# Patient Record
Sex: FEMALE | NOT HISPANIC OR LATINO | Employment: FULL TIME | URBAN - NONMETROPOLITAN AREA
[De-identification: names, ages, dates, MRNs, and addresses within clinical notes are randomized per-mention and may not be internally consistent; named-entity substitution may affect disease eponyms.]

---

## 2024-01-28 ENCOUNTER — HOSPITAL ENCOUNTER (EMERGENCY)
Facility: HOSPITAL | Age: 29
Discharge: HOME | End: 2024-01-28
Attending: EMERGENCY MEDICINE
Payer: COMMERCIAL

## 2024-01-28 ENCOUNTER — APPOINTMENT (OUTPATIENT)
Dept: RADIOLOGY | Facility: HOSPITAL | Age: 29
End: 2024-01-28
Payer: COMMERCIAL

## 2024-01-28 VITALS
HEIGHT: 63 IN | OXYGEN SATURATION: 99 % | TEMPERATURE: 97.8 F | WEIGHT: 167 LBS | RESPIRATION RATE: 18 BRPM | SYSTOLIC BLOOD PRESSURE: 127 MMHG | HEART RATE: 92 BPM | BODY MASS INDEX: 29.59 KG/M2 | DIASTOLIC BLOOD PRESSURE: 93 MMHG

## 2024-01-28 DIAGNOSIS — S83.005A PATELLAR DISLOCATION, LEFT, INITIAL ENCOUNTER: Primary | ICD-10-CM

## 2024-01-28 PROCEDURE — 99283 EMERGENCY DEPT VISIT LOW MDM: CPT | Mod: 25

## 2024-01-28 PROCEDURE — 99283 EMERGENCY DEPT VISIT LOW MDM: CPT | Performed by: EMERGENCY MEDICINE

## 2024-01-28 PROCEDURE — 73564 X-RAY EXAM KNEE 4 OR MORE: CPT | Mod: LT

## 2024-01-28 PROCEDURE — 73564 X-RAY EXAM KNEE 4 OR MORE: CPT | Mod: LEFT SIDE | Performed by: RADIOLOGY

## 2024-01-28 RX ORDER — ACETAMINOPHEN 325 MG/1
650 TABLET ORAL ONCE
Status: COMPLETED | OUTPATIENT
Start: 2024-01-28 | End: 2024-01-28

## 2024-01-28 RX ADMIN — ACETAMINOPHEN 650 MG: 325 TABLET ORAL at 21:33

## 2024-01-28 ASSESSMENT — PAIN SCALES - GENERAL
PAINLEVEL_OUTOF10: 8
PAINLEVEL_OUTOF10: 3
PAINLEVEL_OUTOF10: 8

## 2024-01-28 ASSESSMENT — PAIN - FUNCTIONAL ASSESSMENT
PAIN_FUNCTIONAL_ASSESSMENT: 0-10

## 2024-01-28 ASSESSMENT — PAIN DESCRIPTION - ORIENTATION
ORIENTATION: LEFT
ORIENTATION: LEFT

## 2024-01-28 ASSESSMENT — COLUMBIA-SUICIDE SEVERITY RATING SCALE - C-SSRS
1. IN THE PAST MONTH, HAVE YOU WISHED YOU WERE DEAD OR WISHED YOU COULD GO TO SLEEP AND NOT WAKE UP?: NO
6. HAVE YOU EVER DONE ANYTHING, STARTED TO DO ANYTHING, OR PREPARED TO DO ANYTHING TO END YOUR LIFE?: NO
2. HAVE YOU ACTUALLY HAD ANY THOUGHTS OF KILLING YOURSELF?: NO

## 2024-01-28 ASSESSMENT — PAIN DESCRIPTION - ONSET: ONSET: SUDDEN

## 2024-01-28 ASSESSMENT — PAIN DESCRIPTION - FREQUENCY: FREQUENCY: CONSTANT/CONTINUOUS

## 2024-01-28 ASSESSMENT — PAIN DESCRIPTION - LOCATION
LOCATION: KNEE
LOCATION: KNEE

## 2024-01-28 NOTE — Clinical Note
Ambreen Delgado was seen and treated in our emergency department on 1/28/2024.  She may return to work on 01/31/2024.  Orthopedic clearance required to return to work     If you have any questions or concerns, please don't hesitate to call.      Layla West MD

## 2024-01-29 NOTE — ED PROVIDER NOTES
"HPI   Chief Complaint   Patient presents with    Knee Injury         History provided by:  Patient   used: No         This patient presents to the emergency department ambulatory via private vehicle for evaluation of left knee injury.  Patient states she was getting out of the shower and tried to get dressed at home just prior to arrival when she lost her balance and fell.  She is not certain if she landed directly on her knee, but she relates that her kneecap was \"pointing off to the side\" and was severely painful.  Patient states that when she straightened her leg she felt it pop back into place, but is still having pain; therefore, decided come to emergency department.  She gave herself some ibuprofen prior to arrival.  She states she has knees that tend to \"give out on me\", but has never had an orthopedic evaluation or any specific knee injury that she is aware of.  No open wounds.  No numbness or tingling.  She denies any other injury with the fall.               Matt Coma Scale Score: 15                  Patient History   History reviewed. No pertinent past medical history.  History reviewed. No pertinent surgical history.  No family history on file.  Social History     Tobacco Use    Smoking status: Every Day     Packs/day: 1     Types: Cigarettes    Smokeless tobacco: Never   Substance Use Topics    Alcohol use: Not on file    Drug use: Not on file       Physical Exam   ED Triage Vitals [01/28/24 2111]   Temperature Heart Rate Respirations BP   36.6 °C (97.8 °F) (!) 122 18 145/89      SpO2 Temp Source Heart Rate Source Patient Position   98 % Skin -- Sitting      BP Location FiO2 (%)     Left arm --       Physical Exam  Vitals reviewed.   Constitutional:       Appearance: Normal appearance.   HENT:      Head: Normocephalic and atraumatic.   Cardiovascular:      Pulses: Normal pulses.   Pulmonary:      Effort: Pulmonary effort is normal.   Musculoskeletal:         General: Tenderness " present.      Comments: Patient left anterior musculoskeletal: examination of the symptomatic knee shows No visiible redness, warmth or swelling. No jointline tenderness. No ligamentous laxity. No effusion. Negative patellar apprehension. Intact popliteal pulse. Intact DP and PT pulses.   No joint line tenderness.  No effusion.  Positive patellar apprehension.  Intact patellar tendon to palpation.  Able to fully extend her knee against gravity.  Causes pain with flexion.   Skin:     General: Skin is warm and dry.      Capillary Refill: Capillary refill takes less than 2 seconds.      Findings: No bruising, erythema or rash.   Neurological:      General: No focal deficit present.      Mental Status: She is alert.   Psychiatric:         Mood and Affect: Mood normal.       ED Medication Administration from 01/28/2024 2105 to 01/28/2024 2213         Date/Time Order Dose Route Action Action by     01/28/2024 2133 EST acetaminophen (Tylenol) tablet 650 mg 650 mg oral Given PEDRITO Cr          XR knee left 4+ views   Final Result   Normal radiographs of the left knee             MACRO:   None        Signed by: Gian Banks 1/28/2024 9:59 PM   Dictation workstation:   [id number]            ED Course & MDM   ED Course as of 01/28/24 2220   Sun Jan 28, 2024 2206 Results reviewed with patient [MN]      ED Course User Index  [MN] Layla West MD         Diagnoses as of 01/28/24 2220   Patellar dislocation, left, initial encounter       Medical Decision Making  Presents emergency department with the above history and physical.  Patient has no evidence of neurovascular injury or deformity.  No open wounds.  Given mechanism of injury and areas of pain and tenderness x-rays obtained to evaluate for joint derangement or bony injury.  Patient was given oral Tylenol for pain and ice pack was provided by nursing staff.  X-rays were read by radiology.  Evidence of dislocation/subluxation, effusion, fracture.  Patient placed  in knee immobilizer by nursing staff and provided crutches for additional support and comfort.  Orthopedic evaluation advised.    Results of exam and any testing were discussed with patient/family. To the best of my ability, I answered all questions. At this time, there is no indication for admission/transfer or further diagnostic testing. Patient understands to return for any new or worsening symptoms, or failure to improve as anticipated. The importance of follow-up was stressed.    Procedure  Procedures    Diagnoses as of 01/28/24 2220   Patellar dislocation, left, initial encounter        Layla West MD  01/28/24 [id number]

## 2024-01-29 NOTE — DISCHARGE INSTRUCTIONS
You may remove the brace when you are just seated with your legs up as needed for comfort.    Tylenol, Motrin, ice for pain and swelling control.    Return to the emergency department for cold, pale, blue foot that does not improve by loosening the brace.

## 2024-01-29 NOTE — ED TRIAGE NOTES
"Pt ambulatory to ED c/o LEFT knee injury tonight.  Pt states she fell, denies head injury or LOC, states that her LEFT knee cap was \"off to the side\" but went back into place when she straightened her leg.  Observed no obvious deformity of swelling, cold pack applied.  "

## 2024-01-31 ENCOUNTER — OFFICE VISIT (OUTPATIENT)
Dept: ORTHOPEDIC SURGERY | Facility: CLINIC | Age: 29
End: 2024-01-31
Payer: COMMERCIAL

## 2024-01-31 DIAGNOSIS — S89.92XA LEFT KNEE INJURY, INITIAL ENCOUNTER: ICD-10-CM

## 2024-01-31 DIAGNOSIS — S83.005A PATELLAR DISLOCATION, LEFT, INITIAL ENCOUNTER: Primary | ICD-10-CM

## 2024-01-31 PROCEDURE — 99203 OFFICE O/P NEW LOW 30 MIN: CPT

## 2024-01-31 PROCEDURE — L1812 KO ELASTIC W/JOINTS PRE OTS: HCPCS

## 2024-01-31 NOTE — PROGRESS NOTES
HPI  Ambreen Delgado is a 28 y.o. female  in office today for   Chief Complaint   Patient presents with    Left Knee - Injury     Pt was getting dressed Sunday night and felt knee give out-she noticed knee cap was out of place. It popped back in when she straightened her leg. She went to the ER-has been in brace. Has had pain since.   . She states it feels unstable if she bends more than 90 or tries to lock the knee out. she has no issues with this knee prior, no previous dislocations.  She works as an aide which requires lots of standing, bending, and lifting with the knee.      Medication  No current outpatient medications on file prior to visit.     No current facility-administered medications on file prior to visit.       Physical Exam  Constitutional: well developed, well nourished female in no acute distress  Psychiatric: normal mood, appropriate affect  Eyes: sclera anicteric  HENT: normocephalic/atraumatic  CV: regular rate and rhythm   Respiratory: non labored breathing  Integumentary: no rash  Neurological: moves all extremities    Left Knee Exam     Tenderness   The patient is experiencing tenderness in the patella and patellar tendon.    Range of Motion   Extension:  -5   Flexion:  90     Tests   Varus: negative Valgus: negative  Patellar apprehension: trace    Other   Erythema: absent  Scars: absent  Sensation: normal  Swelling: mild              Imaging/Lab:  X-rays were taken 1/28/24 which were reviewed by myself and read by radiology and show no evidence of fracture or dislocation.  No effusion.  Joints are maintained      Assessment  Assessment: Left patella dislocation, left knee pain    Plan  Plan:  History, physical exam, and imaging were reviewed with patient. Replaced knee immobilizing brace with patella stabilizing brace.  Discussed bracing being a temporary fix until we can get into PT and work on strengthening the knee.  PT orders given.  Letter written for off work for the next two weeks,  note can be modified as needed based on pain and rehab.  Continue with RICE and antiinflammatory medication.  Follow Up: Patient to follow up as needed if pain persists or gets worse after trial of therapy or if the knee dislocates again.      All questions were answered for the patient prior to end of exam and patient addressed their understanding.    Vicky Mireles PA-C  01/31/24

## 2024-01-31 NOTE — LETTER
January 31, 2024     Patient: Ambreen Delgado   YOB: 1995   Date of Visit: 1/31/2024       To Whom It May Concern:    It is my medical opinion that Ambreen Delgado should remain off of work until 2-14-24.     If you have any questions or concerns, please don't hesitate to call.         Sincerely,        Vicky Mireles PA-C    CC: No Recipients

## 2024-02-08 ENCOUNTER — EVALUATION (OUTPATIENT)
Dept: PHYSICAL THERAPY | Facility: HOSPITAL | Age: 29
End: 2024-02-08
Payer: COMMERCIAL

## 2024-02-08 DIAGNOSIS — S83.005A PATELLAR DISLOCATION, LEFT, INITIAL ENCOUNTER: Primary | ICD-10-CM

## 2024-02-08 PROCEDURE — 97162 PT EVAL MOD COMPLEX 30 MIN: CPT | Mod: GP

## 2024-02-08 ASSESSMENT — ENCOUNTER SYMPTOMS
DEPRESSION: 0
OCCASIONAL FEELINGS OF UNSTEADINESS: 0
LOSS OF SENSATION IN FEET: 0

## 2024-02-08 ASSESSMENT — PAIN - FUNCTIONAL ASSESSMENT: PAIN_FUNCTIONAL_ASSESSMENT: 0-10

## 2024-02-08 ASSESSMENT — PATIENT HEALTH QUESTIONNAIRE - PHQ9
2. FEELING DOWN, DEPRESSED OR HOPELESS: NOT AT ALL
1. LITTLE INTEREST OR PLEASURE IN DOING THINGS: NOT AT ALL
SUM OF ALL RESPONSES TO PHQ9 QUESTIONS 1 AND 2: 0

## 2024-02-08 ASSESSMENT — PAIN SCALES - GENERAL: PAINLEVEL_OUTOF10: 0 - NO PAIN

## 2024-02-08 ASSESSMENT — COLUMBIA-SUICIDE SEVERITY RATING SCALE - C-SSRS
6. HAVE YOU EVER DONE ANYTHING, STARTED TO DO ANYTHING, OR PREPARED TO DO ANYTHING TO END YOUR LIFE?: NO
1. IN THE PAST MONTH, HAVE YOU WISHED YOU WERE DEAD OR WISHED YOU COULD GO TO SLEEP AND NOT WAKE UP?: NO
2. HAVE YOU ACTUALLY HAD ANY THOUGHTS OF KILLING YOURSELF?: NO

## 2024-02-08 NOTE — PROGRESS NOTES
Physical Therapy    Physical Therapy Evaluation    Patient Name: Ambreen Delgado  MRN: [id number]  Today's Date: 2/8/2024  Time Calculation  Start Time: 1430  Stop Time: 1522  Time Calculation (min): 52 min    Assessment  PT Assessment Results: Decreased strength, Decreased endurance, Decreased mobility  Rehab Prognosis: Good  Evaluation/Treatment Tolerance: Patient tolerated treatment well  Barriers to Participation:  (No)    Plan  Treatment/Interventions: Education/ Instruction, Gait training, Neuromuscular re-education, Therapeutic activities, Therapeutic exercises  PT Plan: Skilled PT  PT Frequency: 2 times per week  Duration: 4 wks  Number of Treatments Authorized: Awaiting authorization  Rehab Potential: Good  Plan of Care Agreement: Patient    Current Problem  1. Patellar dislocation, left, initial encounter  Referral to Physical Therapy    Follow Up In Physical Therapy          Subjective   General:  General  Reason for Referral: Eval and treat s/p L knee patellar dislocation 10 days ago.  Referred By: Vicky WAKEFIELD  Past Medical History Relevant to Rehab: Fell 10 days ago with lateral pat dislocation. To ER at [place] with spontaneous reduction. Patient given brace and crutches and was referred to otho. Now referred for strengthening. No longer on crutches.  General Comment: Off work x 2 weeks per ortho.    Precautions:  Precautions  STEADI Fall Risk Score (The score of 4 or more indicates an increased risk of falling): 5  LE Weight Bearing Status: Weight Bearing as Tolerated     Pain:  Pain Assessment: 0-10  Pain Score: 0 - No pain     Prior Function Per Pt/Caregiver Report:  Level of Willis: Independent with ADLs and functional transfers, Independent with homemaking with ambulation  Vocational: Full time employment (Nursing aide.)    Objective   Posture:   WNL  Range of Motion:  Range of Motion Comments: 0-138 L knee flexion  Strength:  Strength Comments: 4/5 L thigh ms.  Flexibility:    WNL  Palpation:  Palpation Comment: NTTP  Special Tests:  Special Tests Comment: negative patellar apprehension, Positive lateral collateral laxity  Gait:  Gait Comment: Amb to dept without devices wearing knee brace.  Balance:   WNL  Stairs:   Performing 1 at a time  KNEE  Observation   No effusion  Knee Palpation/Joint Mobility   NTTP  Knee AROM   0-138  Knee MMT   4/5 L thigh ms.  Special Tests   + lateral collateral laxity  Gait   No devices  Outcome Measures:  Timed Up and Go Test  TUG Manual: 25 sec    Other Measures  5x Sit to Stand: 43 sec  Lower Extremity Funtional Score (LEFS): 44     OP EDUCATION:  Outpatient Education  Individual(s) Educated: Patient  Education Provided: Anatomy, Body Mechanics, Home Exercise Program (taught QS, SLR and HS)  Risk and Benefits Discussed with Patient/Caregiver/Other: yes  Patient/Caregiver Demonstrated Understanding: yes  Plan of Care Discussed and Agreed Upon: yes    Goals:  Active       PT Problem       The patient will demonstrate full understanding and competent performance of their home exercise program to maintain or potentially further improve their presenting condition.        Start:  02/08/24    Expected End:  03/08/24            The patient will ambulate 10 minutes continuously without pain increasing to greater than 1/10 to allow return to required community ambulation tasks.        Start:  02/08/24    Expected End:  03/08/24            The patient will improve their performance in the 5 time sit to stand test to < 15 sec to demonstrate increased balance and LE strength required for safe functional transfers and gait.        Start:  02/08/24    Expected End:  03/08/24            The patient will decrease their time in the timed up and go to <15 seconds to demonstrate improved balance and functional ambulatory ability.        Start:  02/08/24    Expected End:  03/08/24            Increase L thigh MMT to 5/5 for optimal joint support.       Start:  02/08/24     Expected End:  03/08/24

## 2024-02-09 ENCOUNTER — TELEPHONE (OUTPATIENT)
Dept: ORTHOPEDIC SURGERY | Facility: CLINIC | Age: 29
End: 2024-02-09
Payer: COMMERCIAL

## 2024-02-09 NOTE — LETTER
Date: 2024  RE:  Ambreen Delgado  :  1995      To Whom It May Concern:    Our patient, Ambreen, has been under our care and now may return back to work without restrictions.    Their return to work date is:  3/1/2024    If you have questions concerning this patient's immediate care, please feel free to contact our office at [phone].    Sincerely,      Mayra Silva  Supervising Provider: Vicky Mireles PA-C

## 2024-02-09 NOTE — TELEPHONE ENCOUNTER
1/31/24 LT KNEE INJURY  Patient had a return to work date of 2/14/24. Physical therapy states they're not comfortable with her returning to work as her joint and ligaments are still very loose. Patients next PT visit is 2/13/24. Are we able to extend the return to work date?    [email or web address]

## 2024-02-13 ENCOUNTER — APPOINTMENT (OUTPATIENT)
Dept: PHYSICAL THERAPY | Facility: HOSPITAL | Age: 29
End: 2024-02-13
Payer: COMMERCIAL

## 2024-02-15 ENCOUNTER — APPOINTMENT (OUTPATIENT)
Dept: PHYSICAL THERAPY | Facility: HOSPITAL | Age: 29
End: 2024-02-15
Payer: COMMERCIAL

## 2024-02-22 ENCOUNTER — TREATMENT (OUTPATIENT)
Dept: PHYSICAL THERAPY | Facility: HOSPITAL | Age: 29
End: 2024-02-22
Payer: COMMERCIAL

## 2024-02-22 DIAGNOSIS — S83.005A PATELLAR DISLOCATION, LEFT, INITIAL ENCOUNTER: ICD-10-CM

## 2024-02-22 PROCEDURE — 97110 THERAPEUTIC EXERCISES: CPT | Mod: GP

## 2024-02-22 ASSESSMENT — PAIN - FUNCTIONAL ASSESSMENT: PAIN_FUNCTIONAL_ASSESSMENT: 0-10

## 2024-02-22 ASSESSMENT — PAIN SCALES - GENERAL: PAINLEVEL_OUTOF10: 1

## 2024-02-22 NOTE — PROGRESS NOTES
Physical Therapy    Physical Therapy Treatment    Patient Name: Ambreen Delgado  MRN: [id number]  Today's Date: 2/22/2024  Time Calculation  Start Time: 1300  Stop Time: 1344  Time Calculation (min): 44 min      Assessment:  PT Assessment  Assessment Comment: Progressing with increased mobility.  Plan:   Continue to progress, adding controlled agility work as indicated.    Current Problem  1. Patellar dislocation, left, initial encounter  Follow Up In Physical Therapy          General  PT  Visit  PT Received On: 02/22/24  General  General Comment: Off work extended to 3/1/2024. No acute pain, just a little sore in pop fossa at times.    Subjective    Pain  Pain Assessment  Pain Assessment: 0-10  Pain Score: 1    Objective   Activity Tolerance:  Activity Tolerance  Activity Tolerance Comments: Tolerated additional CC activities well.    Treatments:  Therapeutic Exercise  Therapeutic Exercise Performed: Yes  Therapeutic Exercise Activity 1: Pro2 x10 min low intensity  Therapeutic Exercise Activity 2: QS/SLR combo 3x10  Therapeutic Exercise Activity 3: LAQ 3x10x2.5  Therapeutic Exercise Activity 4: HS curl 3x10x2.5#  Therapeutic Exercise Activity 5: isokinetic leg flexion/extension [id number]/150  Therapeutic Exercise Activity 6: lateral step up 3x10  Therapeutic Exercise Activity 7: minisquat 3x10    OP EDUCATION:   Advanced HEP to include minisquat and step up focusing on lateral stability.    Goals:  Active       PT Problem       The patient will demonstrate full understanding and competent performance of their home exercise program to maintain or potentially further improve their presenting condition.        Start:  02/08/24    Expected End:  03/08/24            The patient will ambulate 10 minutes continuously without pain increasing to greater than 1/10 to allow return to required community ambulation tasks.        Start:  02/08/24    Expected End:  03/08/24            The patient will improve their performance in  the 5 time sit to stand test to < 15 sec to demonstrate increased balance and LE strength required for safe functional transfers and gait.        Start:  02/08/24    Expected End:  03/08/24            The patient will decrease their time in the timed up and go to <15 seconds to demonstrate improved balance and functional ambulatory ability.        Start:  02/08/24    Expected End:  03/08/24            Increase L thigh MMT to 5/5 for optimal joint support.       Start:  02/08/24    Expected End:  03/08/24

## 2024-02-26 ENCOUNTER — TREATMENT (OUTPATIENT)
Dept: PHYSICAL THERAPY | Facility: HOSPITAL | Age: 29
End: 2024-02-26
Payer: COMMERCIAL

## 2024-02-26 DIAGNOSIS — S83.005A PATELLAR DISLOCATION, LEFT, INITIAL ENCOUNTER: ICD-10-CM

## 2024-02-26 PROCEDURE — 97110 THERAPEUTIC EXERCISES: CPT | Mod: GP,CQ

## 2024-02-26 ASSESSMENT — PAIN SCALES - GENERAL: PAINLEVEL_OUTOF10: 4

## 2024-02-26 ASSESSMENT — PAIN - FUNCTIONAL ASSESSMENT: PAIN_FUNCTIONAL_ASSESSMENT: 0-10

## 2024-02-26 ASSESSMENT — PAIN DESCRIPTION - DESCRIPTORS: DESCRIPTORS: ACHING

## 2024-02-26 NOTE — PROGRESS NOTES
"Physical Therapy    Physical Therapy Treatment    Patient Name: Ambreen Delgado  MRN: [id number]  Today's Date: 2/26/2024  Time Calculation  Start Time: 1100  Stop Time: 1139  Time Calculation (min): 39 min      Assessment:  PT Assessment  Assessment Comment: Patient tolerated additional interventions to program well  Plan:  OP PT Plan  PT Plan: Skilled PT (3/8 tx)  Continue per POC and as patient tolerated     Current Problem  1. Patellar dislocation, left, initial encounter  Follow Up In Physical Therapy          General  PT  Visit  PT Received On: 02/26/24  Response to Previous Treatment: Compliant with home exercise program, Patient reporting fatigue but able to participate.  General  General Comment: Patient states she is feeling more sore today from doing HEP.    Subjective    Precautions  Precautions  LE Weight Bearing Status: Weight Bearing as Tolerated    Pain  Pain Assessment  Pain Assessment: 0-10  Pain Score: 4  Pain Location: Knee  Pain Orientation: Left, Posterior  Pain Descriptors: Aching      Treatments:  Therapeutic Exercise  Therapeutic Exercise Performed: Yes    Pro cycle x10 min, QS/SLR combo 3x10, LAQ 3x10 2.5#, seated EOB HS stretch 4x20 sec, standing HS curl 3x10 2.5#, isokinetic leg flexion/extension 5x10 150/120/90/120/150 4\" L lateral step ups 3x10, mini squats 3x10, standing calf raises 3x10, standing hands on wall alt marches/alt hip 3x10, agilitt ladder drills, in/out, lateral diagonal      Goals:  Active       PT Problem       The patient will demonstrate full understanding and competent performance of their home exercise program to maintain or potentially further improve their presenting condition.  (Progressing)       Start:  02/08/24    Expected End:  03/08/24            The patient will ambulate 10 minutes continuously without pain increasing to greater than 1/10 to allow return to required community ambulation tasks.  (Progressing)       Start:  02/08/24    Expected End:  03/08/24    "         The patient will improve their performance in the 5 time sit to stand test to < 15 sec to demonstrate increased balance and LE strength required for safe functional transfers and gait.  (Progressing)       Start:  02/08/24    Expected End:  03/08/24            The patient will decrease their time in the timed up and go to <15 seconds to demonstrate improved balance and functional ambulatory ability.  (Progressing)       Start:  02/08/24    Expected End:  03/08/24            Increase L thigh MMT to 5/5 for optimal joint support. (Progressing)       Start:  02/08/24    Expected End:  03/08/24

## 2024-02-29 ENCOUNTER — DOCUMENTATION (OUTPATIENT)
Dept: PHYSICAL THERAPY | Facility: HOSPITAL | Age: 29
End: 2024-02-29
Payer: COMMERCIAL

## 2024-02-29 NOTE — PROGRESS NOTES
Physical Therapy                 Therapy Communication Note    Patient Name: Ambreen Delgado  MRN: [id number]  Today's Date: 2/29/2024     Discipline: Physical Therapy    Missed Visit Reason:  Contacted patient: she is sick but forgot to call.    Missed Time: No Show    Comment: Will call to schedule.